# Patient Record
Sex: MALE | Race: BLACK OR AFRICAN AMERICAN | Employment: FULL TIME
[De-identification: names, ages, dates, MRNs, and addresses within clinical notes are randomized per-mention and may not be internally consistent; named-entity substitution may affect disease eponyms.]

---

## 2024-08-27 ENCOUNTER — TRANSCRIBE ORDERS (OUTPATIENT)
Dept: OTHER | Age: 64
End: 2024-08-27

## 2024-08-27 DIAGNOSIS — M25.512 ACUTE PAIN OF LEFT SHOULDER: Primary | ICD-10-CM

## 2024-09-09 ENCOUNTER — THERAPY VISIT (OUTPATIENT)
Dept: PHYSICAL THERAPY | Facility: CLINIC | Age: 64
End: 2024-09-09
Attending: FAMILY MEDICINE
Payer: COMMERCIAL

## 2024-09-09 DIAGNOSIS — M25.512 LEFT SHOULDER PAIN, UNSPECIFIED CHRONICITY: Primary | ICD-10-CM

## 2024-09-09 PROCEDURE — 97161 PT EVAL LOW COMPLEX 20 MIN: CPT | Mod: GP

## 2024-09-09 PROCEDURE — 97110 THERAPEUTIC EXERCISES: CPT | Mod: GP

## 2024-09-09 ASSESSMENT — ACTIVITIES OF DAILY LIVING (ADL)
AT_ITS_WORST?: 10
REACHING_FOR_SOMETHING_ON_A_HIGH_SHELF: 10
PLEASE_INDICATE_YOR_PRIMARY_REASON_FOR_REFERRAL_TO_THERAPY:: SHOULDER
WHEN_LYING_ON_THE_INVOLVED_SIDE: 10
WASHING_YOUR_HAIR?: 7
TOUCHING_THE_BACK_OF_YOUR_NECK: 10
PUTTING_ON_A_SHIRT_THAT_BUTTONS_DOWN_THE_FRONT: 9
PUTTING_ON_YOUR_PANTS: 7
WASHING_YOUR_BACK: 10
PLACING_AN_OBJECT_ON_A_HIGH_SHELF: 8
PUTTING_ON_AN_UNDERSHIRT_OR_A_PULLOVER_SWEATER: 10
REMOVING_SOMETHING_FROM_YOUR_BACK_POCKET: 9
PUSHING_WITH_THE_INVOLVED_ARM: 8
CARRYING_A_HEAVY_OBJECT_OF_10_POUNDS: 9

## 2024-09-09 NOTE — PROGRESS NOTES
PHYSICAL THERAPY EVALUATION  Type of Visit: Evaluation        Fall Risk Screen:  Fall screen completed by: PT  Have you fallen 2 or more times in the past year?: No  Have you fallen and had an injury in the past year?: No  Is patient a fall risk?: No    Subjective  Pt arrives to PT for eval of left shoulder that began insidiously >2 months ago. He has pain with reaching over head and behind his back to put on a belt and pants. He says the pain is sharp and persistent, and feels it the most at night. He states he has radiating pain to his fingers but cannot differentiate which ones that comes and goes and is not persistent. He would like to improve his strength in his upper extremity as well see if PT can help or if he needs further imaging or not. He would also like to get better with daily activities as well as get back to walking and exercise routine.         Presenting condition or subjective complaint:    Date of onset:     insidious onset  Relevant medical history:   NA  Dates & types of surgery:   NA    Prior diagnostic imaging/testing results:       Prior therapy history for the same diagnosis, illness or injury: No      Prior Level of Function  Transfers: Independent  Ambulation: Independent  ADL: Independent      Living Environment  Social support: With family members   Type of home: House   Stairs to enter the home: No       Ramp: No   Stairs inside the home: Yes 1 Is there a railing: No     Help at home: None  Equipment owned:       Employment:      Hobbies/Interests:      Patient goals for therapy: moving my hand and touching my my neck    Pain assessment:  Pt unable to pinpoint location of pain, states it hurts all within shoulder joint      Objective   SHOULDER EXAMINATION  Diagnosis: left shoulder pain     CERVICAL SCREEN  AROM: WNL/symmetrical     THORACIC SPINE SCREEN  WNL  Spring Testing: WNL/No obvious findings    STATIC POSTURE  Forward head: mild   Rounded shoulders:mild  Shoulder internally  rotated: none/WNL   Visual inspection: No obvious findings    DYNAMIC SCAPULAR TESTS  Dynamic Scapular Assessment: NA    SHOULDER RANGE OF MOTION  AROM Flexion Abduction  ER   Base Ext/IR Extension   Left 120 90  Anterior Drift: at 60 deg *painful 20 *painful L5  *painful 10   Right 120 120  Anterior Drift: NA 40 T12 45   Pain: with abduction, ER, IR,     PROM Flexion Abd ER 90-90 IR Horizontal Adduction Extension   Left Pt unable to relax to assess and stated painful with all motions Pt unable to relax to assess and stated painful with all motions Pt unable to relax to assess and stated painful with all motions Pt unable to relax to assess and stated painful with all motions Pt unable to relax to assess and stated painful with all motions Pt unable to relax to assess and stated painful with all motions   Right WNL WNL WNL WNL WNL WNL   Pain: with abduction, ER, IR     Strength Flexion Abd ER 90-90 IR Horizontal Adduction Extension   Left 4- 3+ 3+ 3+ NA 4+   Right 4 4 4 5 NA 5         JOINT MOBILITY  WNL/No obvious findings      SPECIAL TESTS Left Right   Impingement Positive Negative  Subscapularis Positive Negative    Labrum Positive Negative  Sulcus sign Negative Negative  Load and Shift Negative Negative  AC joint Negative Negative      PALPATION  Left: Moderate tenderness to palpation at anterior, lateral and posterior GH joint      Special tests -   Elliott guillen deon: pos  Neer s: pos  Empty can:  pos  Upper cut:  neg  Sulcus sign:  neg  Upper limb tension tests:  *unable to assess as pt was quite flared up in pain and unable to relax for assessment   Spurlings:  neg    Assessment & Plan   CLINICAL IMPRESSIONS  Medical Diagnosis: left shoulder pain    Treatment Diagnosis: left shoulder pain   Impression/Assessment:  Patient is a 64 year old who presents to physical therapy with complaints of left shoulder pain with insidious onset > 2 months ago. The patients symptoms and examination findings correlates with a  diagnosis of left shoulder pain with possible impingment and rotator cuff involvement. The patient presents with impaired AROM with IR > Abd > ER, as well as positive special test findings listed above which inhibits their ability to perform ADLs, recreational activities, as well as regular exercise. Pt was educated of role and expected progression of PT, relevant anatomy, rationale for intervention, PT Px and Dx. The patient tolerated the session well with a HEP consisting of movements in positions that are not painful as well as education on findings.  The patient will continue to benefit from skilled PT for improved strength, ROM, mobility and activity tolerance.        Clinical Decision Making (Complexity):  Clinical Presentation: Stable/Uncomplicated  Clinical Presentation Rationale: based on medical and personal factors listed in PT evaluation  Clinical Decision Making (Complexity): Low complexity    PLAN OF CARE  Treatment Interventions:  Modalities: Cryotherapy, Hot Pack  Interventions: Manual Therapy, Neuromuscular Re-education, Therapeutic Activity, Therapeutic Exercise    Long Term Goals     PT Goal 1  Goal Identifier: ROM  Goal Description: Pt will be able to reach overhead in abduction without pain  Rationale: to maximize safety and independence with performance of ADLs and functional tasks;to maximize safety and independence within the home;to maximize safety and independence with self cares  Goal Progress: in progress  Target Date: 12/02/24  PT Goal 2  Goal Identifier: Strength  Goal Description: Pt will achieve 5/5 MMT strength in all shoulder motions  Rationale: to maximize safety and independence with performance of ADLs and functional tasks;to maximize safety and independence within the home;to maximize safety and independence with self cares  Goal Progress: in progress  Target Date: 12/02/24      Frequency of Treatment: 1x/week  Duration of Treatment: 12 weeks    Education Assessment:    Learner/Method: Patient    Risks and benefits of evaluation/treatment have been explained.   Patient/Family/caregiver agrees with Plan of Care.     Evaluation Time:     PT Aston, Low Complexity Minutes (94384): 15    Signing Clinician: Prabha Valdez PT

## 2024-09-16 ENCOUNTER — THERAPY VISIT (OUTPATIENT)
Dept: PHYSICAL THERAPY | Facility: CLINIC | Age: 64
End: 2024-09-16
Attending: FAMILY MEDICINE
Payer: COMMERCIAL

## 2024-09-16 DIAGNOSIS — M25.512 LEFT SHOULDER PAIN, UNSPECIFIED CHRONICITY: Primary | ICD-10-CM

## 2024-09-16 PROCEDURE — 97140 MANUAL THERAPY 1/> REGIONS: CPT | Mod: GP

## 2024-09-16 PROCEDURE — 97110 THERAPEUTIC EXERCISES: CPT | Mod: GP

## 2024-09-23 ENCOUNTER — THERAPY VISIT (OUTPATIENT)
Dept: PHYSICAL THERAPY | Facility: CLINIC | Age: 64
End: 2024-09-23
Attending: FAMILY MEDICINE
Payer: COMMERCIAL

## 2024-09-23 DIAGNOSIS — M25.512 LEFT SHOULDER PAIN, UNSPECIFIED CHRONICITY: Primary | ICD-10-CM

## 2024-09-23 PROCEDURE — 97110 THERAPEUTIC EXERCISES: CPT | Mod: GP
